# Patient Record
Sex: FEMALE | Race: WHITE | RURAL
[De-identification: names, ages, dates, MRNs, and addresses within clinical notes are randomized per-mention and may not be internally consistent; named-entity substitution may affect disease eponyms.]

---

## 2019-07-09 ENCOUNTER — OFFICE VISIT (OUTPATIENT)
Dept: FAMILY MEDICINE CLINIC | Age: 34
End: 2019-07-09

## 2019-07-09 VITALS
OXYGEN SATURATION: 98 % | BODY MASS INDEX: 22.2 KG/M2 | RESPIRATION RATE: 16 BRPM | SYSTOLIC BLOOD PRESSURE: 116 MMHG | HEART RATE: 75 BPM | WEIGHT: 130 LBS | DIASTOLIC BLOOD PRESSURE: 84 MMHG | TEMPERATURE: 97.2 F | HEIGHT: 64 IN

## 2019-07-09 DIAGNOSIS — Z11.1 PPD SCREENING TEST: ICD-10-CM

## 2019-07-09 DIAGNOSIS — L30.9 ECZEMA, UNSPECIFIED TYPE: Primary | ICD-10-CM

## 2019-07-09 DIAGNOSIS — L20.9 ATOPIC DERMATITIS, UNSPECIFIED TYPE: ICD-10-CM

## 2019-07-09 RX ORDER — FLUOCINONIDE 0.5 MG/G
CREAM TOPICAL 2 TIMES DAILY
COMMUNITY
End: 2019-07-09 | Stop reason: SDUPTHER

## 2019-07-09 RX ORDER — FLUOCINONIDE 0.5 MG/G
CREAM TOPICAL
Qty: 15 G | Refills: 2 | Status: SHIPPED | OUTPATIENT
Start: 2019-07-09

## 2019-07-09 NOTE — PATIENT INSTRUCTIONS
A Healthy Lifestyle: Care Instructions  Your Care Instructions    A healthy lifestyle can help you feel good, stay at a healthy weight, and have plenty of energy for both work and play. A healthy lifestyle is something you can share with your whole family. A healthy lifestyle also can lower your risk for serious health problems, such as high blood pressure, heart disease, and diabetes. You can follow a few steps listed below to improve your health and the health of your family. Follow-up care is a key part of your treatment and safety. Be sure to make and go to all appointments, and call your doctor if you are having problems. It's also a good idea to know your test results and keep a list of the medicines you take. How can you care for yourself at home? · Do not eat too much sugar, fat, or fast foods. You can still have dessert and treats now and then. The goal is moderation. · Start small to improve your eating habits. Pay attention to portion sizes, drink less juice and soda pop, and eat more fruits and vegetables. ? Eat a healthy amount of food. A 3-ounce serving of meat, for example, is about the size of a deck of cards. Fill the rest of your plate with vegetables and whole grains. ? Limit the amount of soda and sports drinks you have every day. Drink more water when you are thirsty. ? Eat at least 5 servings of fruits and vegetables every day. It may seem like a lot, but it is not hard to reach this goal. A serving or helping is 1 piece of fruit, 1 cup of vegetables, or 2 cups of leafy, raw vegetables. Have an apple or some carrot sticks as an afternoon snack instead of a candy bar. Try to have fruits and/or vegetables at every meal.  · Make exercise part of your daily routine. You may want to start with simple activities, such as walking, bicycling, or slow swimming. Try to be active 30 to 60 minutes every day. You do not need to do all 30 to 60 minutes all at once.  For example, you can exercise 3 times a day for 10 or 20 minutes. Moderate exercise is safe for most people, but it is always a good idea to talk to your doctor before starting an exercise program.  · Keep moving. Garret Angelique the lawn, work in the garden, or [employer]. Take the stairs instead of the elevator at work. · If you smoke, quit. People who smoke have an increased risk for heart attack, stroke, cancer, and other lung illnesses. Quitting is hard, but there are ways to boost your chance of quitting tobacco for good. ? Use nicotine gum, patches, or lozenges. ? Ask your doctor about stop-smoking programs and medicines. ? Keep trying. In addition to reducing your risk of diseases in the future, you will notice some benefits soon after you stop using tobacco. If you have shortness of breath or asthma symptoms, they will likely get better within a few weeks after you quit. · Limit how much alcohol you drink. Moderate amounts of alcohol (up to 2 drinks a day for men, 1 drink a day for women) are okay. But drinking too much can lead to liver problems, high blood pressure, and other health problems. Family health  If you have a family, there are many things you can do together to improve your health. · Eat meals together as a family as often as possible. · Eat healthy foods. This includes fruits, vegetables, lean meats and dairy, and whole grains. · Include your family in your fitness plan. Most people think of activities such as jogging or tennis as the way to fitness, but there are many ways you and your family can be more active. Anything that makes you breathe hard and gets your heart pumping is exercise. Here are some tips:  ? Walk to do errands or to take your child to school or the bus.  ? Go for a family bike ride after dinner instead of watching TV. Where can you learn more? Go to http://artie-juaquin.info/. Enter B139 in the search box to learn more about \"A Healthy Lifestyle: Care Instructions. \"  Current as of: September 11, 2018  Content Version: 11.9  © 0097-2740 [employer]. Care instructions adapted under license by [employer] (which disclaims liability or warranty for this information). If you have questions about a medical condition or this instruction, always ask your healthcare professional. [address] any warranty or liability for your use of this information. Tuberculin Skin Test: Care Instructions  Your Care Instructions    Tuberculosis (TB) is a bacterial infection that can damage the lungs or other parts of the body. The TB skin test can tell if you have TB bacteria in your body. Many people are exposed to TB and test positive for TB bacteria in their bodies, but they don't get the disease. TB bacteria can stay in your body without making you sick. This is because your immune system can keep TB in check. Your doctor may want you to have a TB skin test if you have been in close contact with someone who has TB. Or you may need the test if you have symptoms that might be caused by TB, such as a cough that does not go away, a fever, or weight loss. You also may get the test if you are a health care worker. During the skin test, part of a TB bacterium is injected under your skin. The test will feel like a skin prick. If you have TB bacteria in your body, a firm red bump will form at the shot site within 2 days. If the test shows that you are infected with TB (positive), your doctor probably will order more tests. A TB-positive skin test can't tell when you became infected with TB. And it can't tell whether the infection can be passed to others. Follow-up care is a key part of your treatment and safety. Be sure to make and go to all appointments, and call your doctor if you are having problems. It's also a good idea to know your test results and keep a list of the medicines you take. How can you care for yourself at home?   · Do not scratch the test site. Scratching it may cause redness or swelling. This could affect the test results. · To ease itching, put a cold washcloth on the site. Then pat the site dry. · Do not cover the test site with a bandage or other dressing. · Go back to your doctor's office or hospital to have the test read on the follow-up date. This must be done between 48 and 72 hours after you get the shot. When should you call for help? Watch closely for changes in your health, and be sure to contact your doctor if you have any problems. Where can you learn more? Go to http://artie-juaquin.info/. Enter ([phone] 4644-1203 in the search box to learn more about \"Tuberculin Skin Test: Care Instructions. \"  Current as of: July 30, 2018  Content Version: 11.9  © 9251-7280 [employer]. Care instructions adapted under license by "[employer] (which disclaims liability or warranty for this information). If you have questions about a medical condition or this instruction, always ask your healthcare professional. [address] any warranty or liability for your use of this information.

## 2019-07-09 NOTE — PROGRESS NOTES
[facility]  34 y.o. female  1985  [address]  [facility] [address]  [id number]     [id number]: Progress Note       Encounter Date: 7/9/2019    Chief Complaint   Patient presents with   [facility] Other     PPD placement     History of Present Illness   [facility] is a 34 y.o. female who presents to clinic today for:    PPD- Requires PPD screening for [facility]. Have you experienced any of the following symptoms in the past year?  a.) A productive cough for more than 3 weeks? No  b.) Hemoptysis (coughing up blood)? No  c.) Unexplained weight loss? No  d.) Fever, Chills, or night sweats for no known reason? No  e.) Persistent shortness of breath? No  f.) Unexplained fatigue? No  g.) Chest Pain? No  2. Have you had contact with anyone with active tuberculosis disease in the past year? No  3) Do you have a medical condition, or are you taking medications, which suppress   your immune system? No     Atopic dermatitis- states she uses Lidex PRN for flares on her hands, feet and legs. States she uses a small amount in conjunction with other supportive therapy-lotions etc to control the itch/scratch cycle. Requests refills on the cream.    Health Maintenance    Health Maintenance Due   Topic Date Due    DTaP/Tdap/Td series (1 - Tdap) 11/12/2008    PAP AKA CERVICAL CYTOLOGY  11/12/2008     Review of Systems   Review of Systems   Constitutional: Negative. HENT: Negative. Eyes: Negative. Respiratory: Negative. Cardiovascular: Negative. Gastrointestinal: Negative. Genitourinary: Negative. Musculoskeletal: Negative. Skin: Positive for itching and rash. Neurological: Negative. Endo/Heme/Allergies: Negative. Psychiatric/Behavioral: Negative.         Vitals/Objective:     Vitals:    07/09/19 0838   BP: 116/84   Pulse: 75   Resp: 16   Temp: 97.2 °F (36.2 °C)   TempSrc: Oral   SpO2: 98%   Weight: 130 lb (59 kg)   Height: 5' 4\" (1.626 m)     Body mass index is 22.31 kg/m². Physical Exam   Constitutional: She is oriented to person, place, and time. She is active and cooperative. Cardiovascular: Normal rate, regular rhythm, normal heart sounds and normal pulses. Pulmonary/Chest: Effort normal and breath sounds normal.   Neurological: She is alert and oriented to person, place, and time. Skin: Skin is warm, dry and intact. Psychiatric: She has a normal mood and affect. Her speech is normal and behavior is normal. Judgment and thought content normal. Cognition and memory are normal.       No results found for this or any previous visit (from the past 24 hour(s)). Assessment and Plan:   1. PPD screening test      2. Eczema, unspecified type      3. Atopic dermatitis, unspecified type    F/u in 2 days for PPD reading. Refills on Lidex. I have discussed the diagnosis with the patient and the intended plan as seen in the above orders. she has expressed understanding. The patient has received an after-visit summary and questions were answered concerning future plans. I have discussed medication side effects and warnings with the patient as well. Electronically Signed: Hali Haskins NP     History/Allergies   Patients past medical, surgical and family histories were reviewed and updated.     Past Medical History:   Diagnosis Date    Asthma     Eczema       Past Surgical History:   Procedure Laterality Date    HX HEENT      NEUROLOGICAL PROCEDURE UNLISTED       Family History   Problem Relation Age of Onset    Hypertension Mother     Asthma Mother      Social History     Socioeconomic History    Marital status: SINGLE     Spouse name: Not on file    Number of children: Not on file    Years of education: Not on file    Highest education level: Not on file   Occupational History    Not on file   Social Needs    Financial resource strain: Not on file    Food insecurity:     Worry: Not on file     Inability: Not on file   [facility] Transportation needs:     Medical: Not on file     Non-medical: Not on file   Tobacco Use    Smoking status: Never Smoker    Smokeless tobacco: Never Used   Substance and Sexual Activity    Alcohol use: Yes    Drug use: Not on file    Sexual activity: Yes     Partners: Male     Birth control/protection: Pill   Lifestyle    Physical activity:     Days per week: Not on file     Minutes per session: Not on file    Stress: Not on file   Relationships    Social connections:     Talks on phone: Not on file     Gets together: Not on file     Attends Hinduism service: Not on file     Active member of club or organization: Not on file     Attends meetings of clubs or organizations: Not on file     Relationship status: Not on file    Intimate partner violence:     Fear of current or ex partner: Not on file     Emotionally abused: Not on file     Physically abused: Not on file     Forced sexual activity: Not on file   Other Topics Concern    Not on file   Social History Narrative    Not on file         Allergies   Allergen Reactions    Bactrim [Sulfamethoprim Ds] Rash    Contrast Agent [Iodine] Hives    Erythromycin Rash    Keflex [Cephalexin] Rash    Pcn [Penicillins] Rash       Disposition     Follow-up and Dispositions  ·   Return in about 2 days (around 7/11/2019) for PPD reading. Future Appointments   Date Time Provider Meir Rios   7/11/2019  1:40 PM Pierre Hansen, [facility]N SCHED            Current Medications after this visit     Current Outpatient Medications   Medication Sig    fluocinoNIDE (LIDEX) 0.05 % topical cream Apply  to affected area two (2) times daily as needed for Skin Irritation. Apply to bilateral hands and feet.  albuterol (PROVENTIL, VENTOLIN) 90 mcg/actuation inhaler Take 2 Puffs by inhalation every six (6) hours as needed.  desogestrel-ethinyl estradiol (APRI) 0.15-30 mg-mcg per tablet Take 1 Tab by mouth daily.      No current facility-administered medications for this visit.       Medications Discontinued During This Encounter   Medication Reason    triamcinolone (KENALOG) 0.1 % lotion Therapy Completed    fluocinoNIDE (LIDEX) 0.05 % topical cream Reorder

## 2019-07-09 NOTE — PROGRESS NOTES
Reviewed record in preparation for visit and have obtained necessary documentation. Patient did not bring medications to visit for review. Information provided on Advanced Directive, Living Will. Body mass index is 22.31 kg/m².    Health Maintenance Due   Topic Date Due    DTaP/Tdap/Td series (1 - Tdap) 11/12/2008    PAP AKA CERVICAL CYTOLOGY  11/12/2008

## 2019-07-11 ENCOUNTER — OFFICE VISIT (OUTPATIENT)
Dept: FAMILY MEDICINE CLINIC | Age: 34
End: 2019-07-11

## 2019-07-11 DIAGNOSIS — Z11.1 ENCOUNTER FOR PPD SKIN TEST READING: Primary | ICD-10-CM

## 2019-07-11 LAB
MM INDURATION POC: 0 MM (ref 0–5)
PPD POC: NEGATIVE NEGATIVE

## 2019-07-11 NOTE — LETTER
7/11/2019 1:46 PM 
 
Ms. Trice Wakefield [address] [address] Results for orders placed or performed in visit on 07/09/19 AMB POC TUBERCULOSIS, INTRADERMAL (SKIN TEST) Result Value Ref Range PPD Negative Negative  
 mm Induration 0 0 - 5 mm PPD placed on 7/09/2019 PPD read on 7/11/2019 Sincerely, July Byrne, NP

## 2019-07-11 NOTE — PROGRESS NOTES
PPD Reading Note  PPD read and results entered in [employer] [employer]. Result: 0 mm induration.   Interpretation: Negative  If test not read within 48-72 hours of initial placement, patient advised to repeat in other arm 1-3 weeks after this test.  Allergic reaction: no